# Patient Record
Sex: FEMALE | Race: WHITE | Employment: FULL TIME | URBAN - METROPOLITAN AREA
[De-identification: names, ages, dates, MRNs, and addresses within clinical notes are randomized per-mention and may not be internally consistent; named-entity substitution may affect disease eponyms.]

---

## 2023-07-13 DIAGNOSIS — E11.8 CONTROLLED DIABETES MELLITUS TYPE 2 WITH COMPLICATIONS, UNSPECIFIED WHETHER LONG TERM INSULIN USE (MULTI): Primary | ICD-10-CM

## 2023-07-13 DIAGNOSIS — E11.8 CONTROLLED DIABETES MELLITUS TYPE 2 WITH COMPLICATIONS, UNSPECIFIED WHETHER LONG TERM INSULIN USE (MULTI): ICD-10-CM

## 2023-07-13 PROBLEM — R79.89 LOW VITAMIN D LEVEL: Status: ACTIVE | Noted: 2023-07-13

## 2023-07-13 PROBLEM — S63.92XA SPRAIN OF HAND, LEFT: Status: ACTIVE | Noted: 2023-07-13
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
## 2023-07-13 PROBLEM — S40.022A CONTUSION OF ARM, LEFT: Status: ACTIVE | Noted: 2023-07-13

## 2023-07-13 PROBLEM — R09.81 HEAD CONGESTION: Status: ACTIVE | Noted: 2023-07-13

## 2023-07-13 PROBLEM — R19.7 DIARRHEA: Status: ACTIVE | Noted: 2023-07-13

## 2023-07-13 PROBLEM — K21.9 GERD (GASTROESOPHAGEAL REFLUX DISEASE): Status: ACTIVE | Noted: 2023-07-13

## 2023-07-13 PROBLEM — J45.909 ASTHMA (HHS-HCC): Status: ACTIVE | Noted: 2023-07-13

## 2023-07-13 PROBLEM — L72.3 SEBACEOUS CYST: Status: ACTIVE | Noted: 2023-07-13

## 2023-07-13 PROBLEM — J06.9 VIRAL URI WITH COUGH: Status: ACTIVE | Noted: 2023-07-13

## 2023-07-13 PROBLEM — E78.5 HLD (HYPERLIPIDEMIA): Status: ACTIVE | Noted: 2023-07-13

## 2023-07-13 PROBLEM — E28.2 POLYCYSTIC OVARIES: Status: ACTIVE | Noted: 2023-07-13

## 2023-07-13 PROBLEM — E88.810 METABOLIC SYNDROME: Status: ACTIVE | Noted: 2023-07-13

## 2023-07-13 RX ORDER — PNV 85/IRON/FOLIC/DHA/FISH OIL 40-10-1 MG
CAPSULE ORAL
COMMUNITY
Start: 2020-04-06

## 2023-07-13 RX ORDER — DEXTROAMPHETAMINE SACCHARATE, AMPHETAMINE ASPARTATE, DEXTROAMPHETAMINE SULFATE AND AMPHETAMINE SULFATE 3.75; 3.75; 3.75; 3.75 MG/1; MG/1; MG/1; MG/1
1 TABLET ORAL DAILY
COMMUNITY

## 2023-07-13 RX ORDER — CLINDAMYCIN HYDROCHLORIDE 150 MG/1
CAPSULE ORAL
COMMUNITY
Start: 2022-09-21

## 2023-07-13 RX ORDER — METFORMIN HYDROCHLORIDE 1000 MG/1
1 TABLET ORAL
COMMUNITY
Start: 2016-06-20 | End: 2023-07-13 | Stop reason: SDUPTHER

## 2023-07-13 RX ORDER — METFORMIN HYDROCHLORIDE 1000 MG/1
1000 TABLET ORAL
Qty: 90 TABLET | Refills: 3 | Status: SHIPPED | OUTPATIENT
Start: 2023-07-13 | End: 2023-07-13 | Stop reason: SDUPTHER

## 2023-07-13 RX ORDER — ALBUTEROL SULFATE 90 UG/1
2 AEROSOL, METERED RESPIRATORY (INHALATION) EVERY 4 HOURS PRN
COMMUNITY
Start: 2016-08-31

## 2023-07-13 RX ORDER — TRIAMCINOLONE ACETONIDE 0.25 MG/G
CREAM TOPICAL
COMMUNITY
Start: 2023-06-14

## 2023-07-13 RX ORDER — HYDROCODONE BITARTRATE AND ACETAMINOPHEN 5; 325 MG/1; MG/1
1 TABLET ORAL EVERY 6 HOURS PRN
COMMUNITY
Start: 2022-12-21

## 2023-07-13 RX ORDER — CLINDAMYCIN HYDROCHLORIDE 300 MG/1
CAPSULE ORAL
COMMUNITY
Start: 2022-12-13

## 2023-07-13 NOTE — TELEPHONE ENCOUNTER
Patient left a VM requesting a refill for Metformin to be sent to [facility] in [facility]. She is totally out of her medications.

## 2023-07-14 RX ORDER — METFORMIN HYDROCHLORIDE 1000 MG/1
1000 TABLET ORAL
Qty: 180 TABLET | Refills: 1 | Status: SHIPPED | OUTPATIENT
Start: 2023-07-14

## 2023-07-21 ENCOUNTER — TELEPHONE (OUTPATIENT)
Dept: PRIMARY CARE | Facility: CLINIC | Age: 33
End: 2023-07-21
Payer: COMMERCIAL

## 2023-07-21 DIAGNOSIS — Z12.31 ENCOUNTER FOR SCREENING MAMMOGRAM FOR MALIGNANT NEOPLASM OF BREAST: Primary | ICD-10-CM

## 2023-07-21 NOTE — TELEPHONE ENCOUNTER
Pt would like order put in for mammogram,  Found lumps in right breast.  Never had one done.  Please advise  Ty

## 2023-07-26 ENCOUNTER — TELEPHONE (OUTPATIENT)
Dept: PRIMARY CARE | Facility: CLINIC | Age: 33
End: 2023-07-26
Payer: COMMERCIAL

## 2023-07-26 DIAGNOSIS — Z12.31 ENCOUNTER FOR SCREENING MAMMOGRAM FOR MALIGNANT NEOPLASM OF BREAST: Primary | ICD-10-CM

## 2023-08-18 DIAGNOSIS — N63.0 UNSPECIFIED LUMP IN UNSPECIFIED BREAST: ICD-10-CM

## 2023-08-18 DIAGNOSIS — R92.8 ABNORMAL MAMMOGRAM: ICD-10-CM

## 2024-05-20 ENCOUNTER — TELEPHONE (OUTPATIENT)
Dept: PRIMARY CARE | Facility: CLINIC | Age: 34
End: 2024-05-20
Payer: COMMERCIAL

## 2024-05-22 PROBLEM — R20.2 NUMBNESS AND TINGLING IN BOTH HANDS: Status: ACTIVE | Noted: 2023-11-10

## 2024-05-22 PROBLEM — E66.9 OBESITY, CLASS II, BMI 35-39.9: Status: ACTIVE | Noted: 2023-02-23

## 2024-05-22 PROBLEM — Z86.19 HISTORY OF INFECTION DUE TO HUMAN PAPILLOMA VIRUS (HPV): Status: ACTIVE | Noted: 2024-05-22

## 2024-05-22 PROBLEM — R73.03 PREDIABETES: Status: ACTIVE | Noted: 2019-05-03

## 2024-05-22 PROBLEM — R33.9 INCOMPLETE BLADDER EMPTYING: Status: ACTIVE | Noted: 2023-02-23

## 2024-05-22 PROBLEM — Z86.39 HISTORY OF HYPERCHOLESTEROLEMIA: Status: ACTIVE | Noted: 2024-05-22

## 2024-05-22 PROBLEM — S63.90XA SPRAIN OF HAND: Status: ACTIVE | Noted: 2023-07-13

## 2024-05-22 PROBLEM — R20.0 NUMBNESS AND TINGLING IN BOTH HANDS: Status: ACTIVE | Noted: 2023-11-10

## 2024-05-22 PROBLEM — N92.6 IRREGULAR MENSES: Status: ACTIVE | Noted: 2023-02-23

## 2024-05-22 PROBLEM — N89.8 VAGINAL DISCHARGE: Status: ACTIVE | Noted: 2022-05-16

## 2024-05-22 PROBLEM — S40.029A CONTUSION OF UPPER EXTREMITY: Status: ACTIVE | Noted: 2023-07-13

## 2024-05-22 PROBLEM — F32.A DEPRESSION: Status: ACTIVE | Noted: 2023-02-23

## 2024-05-22 PROBLEM — J98.8 CONGESTION OF RESPIRATORY TRACT: Status: ACTIVE | Noted: 2023-07-13
